# Patient Record
Sex: FEMALE | Race: WHITE | Employment: OTHER | URBAN - NONMETROPOLITAN AREA
[De-identification: names, ages, dates, MRNs, and addresses within clinical notes are randomized per-mention and may not be internally consistent; named-entity substitution may affect disease eponyms.]

---

## 2021-09-29 ENCOUNTER — TELEPHONE (OUTPATIENT)
Dept: NEUROSURGERY | Age: 46
End: 2021-09-29

## 2021-10-01 ENCOUNTER — TELEPHONE (OUTPATIENT)
Dept: NEUROSURGERY | Age: 46
End: 2021-10-01

## 2021-12-01 ENCOUNTER — TELEPHONE (OUTPATIENT)
Dept: NEUROSURGERY | Age: 46
End: 2021-12-01

## 2021-12-01 ENCOUNTER — OFFICE VISIT (OUTPATIENT)
Dept: NEUROSURGERY | Age: 46
End: 2021-12-01
Payer: MEDICAID

## 2021-12-01 VITALS
HEART RATE: 85 BPM | HEIGHT: 67 IN | DIASTOLIC BLOOD PRESSURE: 90 MMHG | BODY MASS INDEX: 17.58 KG/M2 | SYSTOLIC BLOOD PRESSURE: 141 MMHG | WEIGHT: 112 LBS

## 2021-12-01 DIAGNOSIS — M54.50 ACUTE BILATERAL LOW BACK PAIN WITHOUT SCIATICA: ICD-10-CM

## 2021-12-01 DIAGNOSIS — R29.898 RIGHT LEG WEAKNESS: ICD-10-CM

## 2021-12-01 DIAGNOSIS — M54.2 NECK PAIN: Primary | ICD-10-CM

## 2021-12-01 DIAGNOSIS — M25.512 ACUTE PAIN OF LEFT SHOULDER: ICD-10-CM

## 2021-12-01 DIAGNOSIS — R29.898 LEFT ARM WEAKNESS: ICD-10-CM

## 2021-12-01 DIAGNOSIS — G44.52 NEW PERSISTENT DAILY HEADACHE: ICD-10-CM

## 2021-12-01 PROCEDURE — G8484 FLU IMMUNIZE NO ADMIN: HCPCS | Performed by: NURSE PRACTITIONER

## 2021-12-01 PROCEDURE — 99204 OFFICE O/P NEW MOD 45 MIN: CPT | Performed by: NURSE PRACTITIONER

## 2021-12-01 PROCEDURE — 1036F TOBACCO NON-USER: CPT | Performed by: NURSE PRACTITIONER

## 2021-12-01 PROCEDURE — G8419 CALC BMI OUT NRM PARAM NOF/U: HCPCS | Performed by: NURSE PRACTITIONER

## 2021-12-01 PROCEDURE — G8427 DOCREV CUR MEDS BY ELIG CLIN: HCPCS | Performed by: NURSE PRACTITIONER

## 2021-12-01 RX ORDER — GABAPENTIN 300 MG/1
300 CAPSULE ORAL EVERY 8 HOURS
Qty: 90 CAPSULE | Refills: 2 | Status: SHIPPED | OUTPATIENT
Start: 2021-12-01 | End: 2022-03-01 | Stop reason: SDUPTHER

## 2021-12-01 RX ORDER — GABAPENTIN 100 MG/1
100 CAPSULE ORAL EVERY 8 HOURS
COMMUNITY
Start: 2021-07-24 | End: 2021-12-01 | Stop reason: SDUPTHER

## 2021-12-01 RX ORDER — UBROGEPANT 100 MG/1
TABLET ORAL
Qty: 10 TABLET | Refills: 3 | Status: SHIPPED | OUTPATIENT
Start: 2021-12-01

## 2021-12-01 RX ORDER — GABAPENTIN 100 MG/1
100 CAPSULE ORAL EVERY 8 HOURS
Qty: 90 CAPSULE | Refills: 2 | Status: SHIPPED | OUTPATIENT
Start: 2021-12-01 | End: 2021-12-01 | Stop reason: SDUPTHER

## 2021-12-01 RX ORDER — METHOCARBAMOL 500 MG/1
500 TABLET, FILM COATED ORAL DAILY
COMMUNITY
Start: 2021-10-14

## 2021-12-01 NOTE — PROGRESS NOTES
REVIEW OF SYSTEMS    Constitutional: []Fever []Sweats []Chills [] Recent Injury [x] Denies all unless marked  HEENT:[]Headache  [] Head Injury [] Hearing Loss  [] Sore Throat  [] Ear Ache [x] Denies all unless marked  Spine:  [] Neck pain  [] Back pain  [] Sciaticia  [x] Denies all unless marked  Cardiovascular:[]Heart Disease []Palpitations [] Chest Pain   [x] Denies all unless marked  Pulmonary: []Shortness of Breath []Cough   [x] Denies all unless marked  Psychiatric/Behavioral:[] Depression [] Anxiety [x] Denies all unless marked  Gastrointestinal: []Nausea  []Vomiting  []Abdominal Pain  []Constipation  []Diarrhea  [x] Denies all unless marked  Genitourinary:   [] Frequency  [] Urgency  [] Dysuria [] Incontinence  [x] Denies all unless marked  Extremities: [x]Pain  []Swelling  [x] Denies all unless marked  Musculoskeletal: [] Myalgias  [x] Joint Pain  [] Arthritis [x] Muscle Cramps [] Muscle Twitches  [x] Denies all unless marked  Sleep: []Insomnia[]Snoring []Restless Legs  []Sleep Apnea  []Daytime Sleepiness  [x] Denies all unless marked  Skin:[] Rash [] Color Change [x] Denies all unless marked   Neurological:[]Visual Disturbance [] Memory Loss []Loss of Balance []Slurred Speech []Weakness []Seizures  [] Dizziness [x] Denies all unless marked
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
## 2021-12-01 NOTE — TELEPHONE ENCOUNTER
Mei spoke with Wale Wright her to cancel the 100mg script of gabapentin pt will be able to  300mg script. Carin verbalized understanding with read back.

## 2021-12-06 ENCOUNTER — HOSPITAL ENCOUNTER (OUTPATIENT)
Dept: MRI IMAGING | Age: 46
Discharge: HOME | End: 2021-12-06
Payer: MEDICAID

## 2021-12-06 DIAGNOSIS — R29.898 RIGHT LEG WEAKNESS: ICD-10-CM

## 2021-12-06 DIAGNOSIS — I67.1 BRAIN ANEURYSM: Primary | ICD-10-CM

## 2021-12-06 DIAGNOSIS — M54.50 ACUTE BILATERAL LOW BACK PAIN WITHOUT SCIATICA: ICD-10-CM

## 2021-12-06 DIAGNOSIS — G44.52 NEW PERSISTENT DAILY HEADACHE: ICD-10-CM

## 2021-12-06 DIAGNOSIS — M54.2 NECK PAIN: ICD-10-CM

## 2021-12-06 PROCEDURE — 70553 MRI BRAIN STEM W/O & W/DYE: CPT

## 2021-12-06 PROCEDURE — 72156 MRI NECK SPINE W/O & W/DYE: CPT

## 2021-12-06 PROCEDURE — 6360000004 HC RX CONTRAST MEDICATION: Performed by: NURSE PRACTITIONER

## 2021-12-06 PROCEDURE — 72158 MRI LUMBAR SPINE W/O & W/DYE: CPT

## 2021-12-06 PROCEDURE — A9585 GADOBUTROL INJECTION: HCPCS | Performed by: NURSE PRACTITIONER

## 2021-12-06 RX ADMIN — GADOBUTROL 5 ML: 604.72 INJECTION INTRAVENOUS at 14:34

## 2021-12-07 ENCOUNTER — TELEPHONE (OUTPATIENT)
Dept: NEUROSURGERY | Age: 46
End: 2021-12-07

## 2021-12-07 NOTE — TELEPHONE ENCOUNTER
----- Message from ANA Larsen sent at 12/6/2021  6:37 PM CST -----  Received perfect serve message from Dr. Cynthia Kasper. 6-7mm right MCA aneurysm noted on MRI brain, will plan for CTA head to better visualize and place referral to vascular neurosurgery urgently. I can talk with patient if you would like.

## 2021-12-07 NOTE — TELEPHONE ENCOUNTER
----- Message from ANA Montalvo sent at 12/6/2021  6:37 PM CST -----  Received perfect serve message from Dr. Nba Carmen. 6-7mm right MCA aneurysm noted on MRI brain, will plan for CTA head to better visualize and place referral to vascular neurosurgery urgently. I can talk with patient if you would like.

## 2021-12-07 NOTE — TELEPHONE ENCOUNTER
Called patient had to leave a hank detailed vm asking her to please return my call needing more imaging have it scheduled for 12-8-2021@ 3 pm. Wanting to discuss why in more detail on the phone rather than leaving it on her vm, asked that she return my call

## 2021-12-08 ENCOUNTER — HOSPITAL ENCOUNTER (OUTPATIENT)
Dept: CT IMAGING | Age: 46
Discharge: HOME | End: 2021-12-08
Payer: MEDICAID

## 2021-12-08 DIAGNOSIS — I67.1 BRAIN ANEURYSM: ICD-10-CM

## 2021-12-08 PROCEDURE — 6360000004 HC RX CONTRAST MEDICATION: Performed by: NURSE PRACTITIONER

## 2021-12-08 PROCEDURE — 70496 CT ANGIOGRAPHY HEAD: CPT

## 2021-12-08 RX ADMIN — IOPAMIDOL 75 ML: 755 INJECTION, SOLUTION INTRAVENOUS at 15:48

## 2021-12-08 NOTE — TELEPHONE ENCOUNTER
Patient called back and I told he the results per EG also to let her know that she needed another test patient understood and is having the CTA today

## 2021-12-09 ENCOUNTER — TELEPHONE (OUTPATIENT)
Dept: NEUROSURGERY | Age: 46
End: 2021-12-09

## 2021-12-09 DIAGNOSIS — I67.1 BRAIN ANEURYSM: Primary | ICD-10-CM

## 2021-12-09 NOTE — TELEPHONE ENCOUNTER
Called [facility] got voicemail of intake team. Left voicemail with call back number explaining this referral should have went as urgent instead of as routine. Requested call back at this time. cbc, bmp, t&s done results pending cbc, t&s done results pending

## 2021-12-09 NOTE — TELEPHONE ENCOUNTER
CTA head re-identifies aneurysm. Make sure she is scheduled to see vascular neurosurgery, urgent referral placed on Monday.

## 2021-12-10 NOTE — TELEPHONE ENCOUNTER
Patient called back stated that she missed a call , I did explain the results from the CTA per Chiquis Brewer also told her that Elie Pereira was working on her referral to [facility] for the Neurosurgery, will be expecting the call from Elie Pereira or [facility] patient understood to answer all phone calls also to clear out her vm

## 2021-12-17 NOTE — TELEPHONE ENCOUNTER
Melissa Junior requests a call back please, she requested an update on the status of the referral.    Thank you.

## 2021-12-17 NOTE — TELEPHONE ENCOUNTER
Called [facility]. Spoke with [employer] She stated that a referral was never received. Gave her all information about urgent referral. She advised me to fax all records pertaining to this referral to be reviewed. All records were faxed at this time (see media).      Called Patient explained this in detail and confirmed her appointment for 12/20/2021

## 2021-12-20 ENCOUNTER — TELEPHONE (OUTPATIENT)
Dept: NEUROSURGERY | Age: 46
End: 2021-12-20

## 2021-12-20 NOTE — TELEPHONE ENCOUNTER
Neelam Almaraz called back in advising that [facility] would be too far away for her to travel and patient is wanting to see if there is somewhere closer she can be referred to. Please advise. A good call back time is anytime.  Thank you

## 2021-12-20 NOTE — TELEPHONE ENCOUNTER
Called pt spoke with her. Pt voiced that she had an appointment today with [facility] but was sick and unable to make it. Pt voiced that was a long way away. Informed due to pts insurance we didn't have many options. Pt voiced understanding and stated she would call them to reschedule. Again reinforced the importance to her that this needed to be followed up with. Pt voiced understanding.

## 2022-01-19 ENCOUNTER — TELEPHONE (OUTPATIENT)
Dept: NEUROSURGERY | Age: 47
End: 2022-01-19

## 2022-01-19 NOTE — TELEPHONE ENCOUNTER
Can you call the patient and see what's going on?  If we can't reach her can you let her primary know that she needs to see [facility] for the aneurysm

## 2022-01-19 NOTE — TELEPHONE ENCOUNTER
Was following up on referrals and noticed that [facility] had a tele health visit with  [facility] Physician Regina Gan on 12-20-21. I spoke with Jimmy Field at [facility] and she stated patient has not had surgery, Patient was to have a had a COVID test on 12-31-21 and no showed, patient was scheduled again on 1-21-22 and patient cancelled.

## 2022-02-28 DIAGNOSIS — G44.52 NEW PERSISTENT DAILY HEADACHE: ICD-10-CM

## 2022-02-28 NOTE — TELEPHONE ENCOUNTER
Yaima Helton has requested a refill on her medication. Last office visit : 12/1/2021   Next office visit : 3/25/2022   Last medication refill :12/1/2021 w/2rf   Emma Fitzgerald : 2/21/2022      Requested Prescriptions     Pending Prescriptions Disp Refills    gabapentin (NEURONTIN) 300 MG capsule 90 capsule 2     Sig: Take 1 capsule by mouth every 8 hours for 90 days.

## 2022-03-01 RX ORDER — GABAPENTIN 300 MG/1
300 CAPSULE ORAL EVERY 8 HOURS
Qty: 90 CAPSULE | Refills: 2 | Status: SHIPPED | OUTPATIENT
Start: 2022-03-01 | End: 2022-06-23

## 2022-06-23 DIAGNOSIS — G44.52 NEW PERSISTENT DAILY HEADACHE: ICD-10-CM

## 2022-06-23 RX ORDER — GABAPENTIN 300 MG/1
CAPSULE ORAL
Qty: 90 CAPSULE | Refills: 1 | Status: SHIPPED | OUTPATIENT
Start: 2022-06-23 | End: 2022-08-22

## 2022-06-23 NOTE — TELEPHONE ENCOUNTER
Requested Prescriptions     Pending Prescriptions Disp Refills    gabapentin (NEURONTIN) 300 MG capsule [Pharmacy Med Name: GABAPENTIN 300MG CAPS] 90 capsule 1     Sig: TAKE 1 CAPSULE BY MOUTH EVERY 8 HOURS (30 DAYS)       Last Office Visit:  12/1/2021  Next Office Visit:  none  Last Medication Refill:  3/1/22 with 2 RF  Paris Showers up to date:  6/23/22    *RX updated to reflect   6/23/22  fill date*

## 2022-08-22 DIAGNOSIS — G44.52 NEW PERSISTENT DAILY HEADACHE: ICD-10-CM

## 2022-08-22 RX ORDER — GABAPENTIN 300 MG/1
CAPSULE ORAL
Qty: 90 CAPSULE | Refills: 0 | Status: SHIPPED | OUTPATIENT
Start: 2022-08-24 | End: 2022-09-20

## 2022-08-22 NOTE — TELEPHONE ENCOUNTER
Requested Prescriptions     Pending Prescriptions Disp Refills    gabapentin (NEURONTIN) 300 MG capsule [Pharmacy Med Name: GABAPENTIN 300MG CAPS] 90 capsule 0     Sig: TAKE 1 CAPSULE BY MOUTH EVERY 8 HOURS (30 DAYS)       Last Office Visit:  12/1/2021  Next Office Visit:  Visit date not found  Last Medication Refill:  6/23/2022  Peewee Gums up to date:  8/22/2022     *RX updated to reflect   8/24/2022   fill date*

## 2022-09-20 DIAGNOSIS — G44.52 NEW PERSISTENT DAILY HEADACHE: ICD-10-CM

## 2022-09-20 RX ORDER — GABAPENTIN 300 MG/1
CAPSULE ORAL
Qty: 90 CAPSULE | Refills: 0 | Status: SHIPPED | OUTPATIENT
Start: 2022-09-23 | End: 2022-10-17 | Stop reason: SDUPTHER

## 2022-10-17 DIAGNOSIS — G44.52 NEW PERSISTENT DAILY HEADACHE: ICD-10-CM

## 2022-10-17 RX ORDER — GABAPENTIN 300 MG/1
CAPSULE ORAL
Qty: 90 CAPSULE | Refills: 0 | Status: SHIPPED | OUTPATIENT
Start: 2022-10-17 | End: 2022-11-17

## 2022-10-17 NOTE — TELEPHONE ENCOUNTER
Akutan Mayitoalivia requested a refill of her : -     Gabapentin ( Neurontin ) 300 mg          Last Appt:  12/1/2021  Next Appt: Appointment has been requested  Preferred pharmacy: [facility] [place], [facility]     Patient requests a call back to schedule a f/u appt please.

## 2022-10-17 NOTE — TELEPHONE ENCOUNTER
Requested Prescriptions     Pending Prescriptions Disp Refills    gabapentin (NEURONTIN) 300 MG capsule 90 capsule 0     Sig: TAKE 1 CAPSULE BY MOUTH EVERY 8 HOURS (30 DAYS)       Last Office Visit:  12/1/2021  Next Office Visit:  Visit date not found  Last Medication Refill:  09/20/2022  Rodney Lehman up to date:  10/17/2022    *RX updated to reflect   10/17/2022 fill date*

## 2022-10-31 ENCOUNTER — TELEPHONE (OUTPATIENT)
Dept: NEUROSURGERY | Age: 47
End: 2022-10-31

## 2022-10-31 NOTE — TELEPHONE ENCOUNTER
Telma Montes De Oca requests that office  return their call. The best time to reach her is Anytime. Patient needs scheduled her yearly follow up with Jenny Roman    Thank you.

## 2022-11-14 DIAGNOSIS — G44.52 NEW PERSISTENT DAILY HEADACHE: ICD-10-CM

## 2022-11-15 RX ORDER — GABAPENTIN 300 MG/1
CAPSULE ORAL
Qty: 90 CAPSULE | Refills: 0 | Status: SHIPPED | OUTPATIENT
Start: 2022-11-16 | End: 2022-12-16

## 2022-12-29 DIAGNOSIS — G44.52 NEW PERSISTENT DAILY HEADACHE: ICD-10-CM

## 2022-12-29 NOTE — TELEPHONE ENCOUNTER
Requested Prescriptions     Pending Prescriptions Disp Refills    gabapentin (NEURONTIN) 300 MG capsule [Pharmacy Med Name: GABAPENTIN 300MG CAPS] 90 capsule 0     Sig: TAKE 1 CAPSULE BY MOUTH EVERY 8 HOURS (30 DAYS)       Last Office Visit:  12/1/2021  Next Office Visit:  2/8/23  Last Medication Refill:  11/16/22  [place] up to date:  10/17/22    *RX updated to reflect   12/29/22  fill date*    Travon Danielle pt

## 2022-12-30 RX ORDER — GABAPENTIN 300 MG/1
CAPSULE ORAL
Qty: 90 CAPSULE | Refills: 0 | Status: SHIPPED | OUTPATIENT
Start: 2022-12-30 | End: 2023-01-28

## 2023-01-23 ENCOUNTER — TELEPHONE (OUTPATIENT)
Dept: NEUROLOGY | Age: 48
End: 2023-01-23

## 2023-01-23 NOTE — TELEPHONE ENCOUNTER
Called pt LVM explaining appointment for 2/8 was rescheduled for 3/8 @ 0981. Call back number has been left.

## 2023-02-06 DIAGNOSIS — G44.52 NEW PERSISTENT DAILY HEADACHE: ICD-10-CM

## 2023-02-06 RX ORDER — GABAPENTIN 300 MG/1
CAPSULE ORAL
Qty: 90 CAPSULE | Refills: 0 | Status: SHIPPED | OUTPATIENT
Start: 2023-02-06 | End: 2023-03-05

## 2023-02-06 NOTE — TELEPHONE ENCOUNTER
She must have a follow up with me for me to continue filling this medication (Gabapentin). She was last seen 12/2021.

## 2023-02-06 NOTE — TELEPHONE ENCOUNTER
Requested Prescriptions     Pending Prescriptions Disp Refills    gabapentin (NEURONTIN) 300 MG capsule [Pharmacy Med Name: GABAPENTIN 300MG CAPS] 90 capsule 0     Sig: TAKE 1 CAPSULE BY MOUTH EVERY 8 HOURS (30 DAYS)       Last Office Visit:  12/1/2021  Next Office Visit:  Visit date not found  Last Medication Refill:   12/30/2022 with 0 RF   Vasu up to date:  2/6/2023     *RX updated to reflect   2/6/2023  fill date*

## 2023-02-08 NOTE — TELEPHONE ENCOUNTER
Pt has appointment 3/2023.   No further refills until patient is seen in the [address] [place] [address]

## 2023-03-08 ENCOUNTER — TELEPHONE (OUTPATIENT)
Dept: NEUROLOGY | Age: 48
End: 2023-03-08

## 2023-03-08 ENCOUNTER — OFFICE VISIT (OUTPATIENT)
Dept: NEUROLOGY | Age: 48
End: 2023-03-08
Payer: MEDICAID

## 2023-03-08 ENCOUNTER — HOSPITAL ENCOUNTER (OUTPATIENT)
Dept: CT IMAGING | Age: 48
Discharge: HOME | End: 2023-03-08
Payer: MEDICAID

## 2023-03-08 VITALS
WEIGHT: 112 LBS | HEART RATE: 70 BPM | BODY MASS INDEX: 17.58 KG/M2 | SYSTOLIC BLOOD PRESSURE: 141 MMHG | HEIGHT: 67 IN | DIASTOLIC BLOOD PRESSURE: 85 MMHG | OXYGEN SATURATION: 98 %

## 2023-03-08 DIAGNOSIS — T81.89XA NON-HEALING SURGICAL WOUND, INITIAL ENCOUNTER: Primary | ICD-10-CM

## 2023-03-08 DIAGNOSIS — T81.89XA NON-HEALING SURGICAL WOUND, INITIAL ENCOUNTER: ICD-10-CM

## 2023-03-08 DIAGNOSIS — M25.512 CHRONIC LEFT SHOULDER PAIN: ICD-10-CM

## 2023-03-08 DIAGNOSIS — G89.29 CHRONIC LEFT SHOULDER PAIN: ICD-10-CM

## 2023-03-08 LAB
BASOPHILS ABSOLUTE: 0 K/UL (ref 0–0.2)
BASOPHILS RELATIVE PERCENT: 0.6 % (ref 0–1)
C-REACTIVE PROTEIN: <0.3 MG/DL (ref 0–0.5)
EOSINOPHILS ABSOLUTE: 0.1 K/UL (ref 0–0.6)
EOSINOPHILS RELATIVE PERCENT: 1 % (ref 0–5)
HCT VFR BLD CALC: 46.8 % (ref 37–47)
HEMOGLOBIN: 15.8 G/DL (ref 12–16)
IMMATURE GRANULOCYTES #: 0 K/UL
LYMPHOCYTES ABSOLUTE: 1.8 K/UL (ref 1.1–4.5)
LYMPHOCYTES RELATIVE PERCENT: 36.9 % (ref 20–40)
MCH RBC QN AUTO: 35.3 PG (ref 27–31)
MCHC RBC AUTO-ENTMCNC: 33.8 G/DL (ref 33–37)
MCV RBC AUTO: 104.7 FL (ref 81–99)
MONOCYTES ABSOLUTE: 0.5 K/UL (ref 0–0.9)
MONOCYTES RELATIVE PERCENT: 9.2 % (ref 0–10)
NEUTROPHILS ABSOLUTE: 2.6 K/UL (ref 1.5–7.5)
NEUTROPHILS RELATIVE PERCENT: 52.3 % (ref 50–65)
PDW BLD-RTO: 13.3 % (ref 11.5–14.5)
PLATELET # BLD: 194 K/UL (ref 130–400)
PMV BLD AUTO: 9.1 FL (ref 9.4–12.3)
RBC # BLD: 4.47 M/UL (ref 4.2–5.4)
SEDIMENTATION RATE, ERYTHROCYTE: 8 MM/HR (ref 0–20)
WBC # BLD: 4.9 K/UL (ref 4.8–10.8)

## 2023-03-08 PROCEDURE — G8427 DOCREV CUR MEDS BY ELIG CLIN: HCPCS | Performed by: NURSE PRACTITIONER

## 2023-03-08 PROCEDURE — 1036F TOBACCO NON-USER: CPT | Performed by: NURSE PRACTITIONER

## 2023-03-08 PROCEDURE — 99214 OFFICE O/P EST MOD 30 MIN: CPT | Performed by: NURSE PRACTITIONER

## 2023-03-08 PROCEDURE — G8419 CALC BMI OUT NRM PARAM NOF/U: HCPCS | Performed by: NURSE PRACTITIONER

## 2023-03-08 PROCEDURE — 70450 CT HEAD/BRAIN W/O DYE: CPT

## 2023-03-08 PROCEDURE — G8484 FLU IMMUNIZE NO ADMIN: HCPCS | Performed by: NURSE PRACTITIONER

## 2023-03-08 RX ORDER — UBROGEPANT 100 MG/1
TABLET ORAL
Qty: 16 TABLET | Refills: 3 | Status: SHIPPED | OUTPATIENT
Start: 2023-03-08
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
## 2023-03-08 NOTE — TELEPHONE ENCOUNTER
Dr. Jamee Cr called to discuss patient's case. Reviewed her CT head and no overt bony erosion. Awaiting labs still. Dr. Jamee Cr will work her in to see either him or his nurse practitioner soon. They should be calling her soon to get her scheduled. If she has not heard from them by Friday then have her call us.

## 2023-03-08 NOTE — PROGRESS NOTES
REVIEW OF SYSTEMS    Constitutional: []Fever []Sweats []Chills [] Recent Injury [x] Denies all unless marked  HEENT:[x]Headache  [] Head Injury [] Hearing Loss  [] Sore Throat  [] Ear Ache [x] Denies all unless marked  Spine:  [x] Neck pain  [] Back pain  [] Sciaticia  [x] Denies all unless marked  Cardiovascular:[]Heart Disease []Palpitations [] Chest Pain   [x] Denies all unless marked  Pulmonary: []Shortness of Breath []Cough   [x] Denies all unless marked  Psychiatric/Behavioral:[] Depression [] Anxiety [x] Denies all unless marked  Gastrointestinal: []Nausea  []Vomiting  []Abdominal Pain  []Constipation  []Diarrhea  [x] Denies all unless marked  Genitourinary:   [] Frequency  [] Urgency  [] Dysuria [] Incontinence  [x] Denies all unless marked  Extremities: []Pain  []Swelling  [x] Denies all unless marked  Musculoskeletal: [] Myalgias  [] Joint Pain  [] Arthritis [] Muscle Cramps [] Muscle Twitches  [x] Denies all unless marked  Sleep: []Insomnia[]Snoring []Restless Legs  []Sleep Apnea  []Daytime Sleepiness  [x] Denies all unless marked  Skin:[] Rash [] Color Change [x] Denies all unless marked   Neurological:[]Visual Disturbance [] Memory Loss []Loss of Balance []Slurred Speech []Weakness []Seizures  [] Dizziness [x] Denies all unless marked

## 2023-03-09 NOTE — TELEPHONE ENCOUNTER
Patient called in this morning about results. Advised her of CT results and labs. Pt voiced The[facility] [place] had reached out to her and she has an appointment on 3/13 at 1230? She will call if anything else is needed.

## 2023-03-10 DIAGNOSIS — G44.52 NEW PERSISTENT DAILY HEADACHE: ICD-10-CM

## 2023-03-13 ENCOUNTER — CLINICAL DOCUMENTATION (OUTPATIENT)
Dept: NEUROLOGY | Age: 48
End: 2023-03-13

## 2023-03-13 RX ORDER — GABAPENTIN 300 MG/1
CAPSULE ORAL
Qty: 90 CAPSULE | Refills: 0 | Status: SHIPPED | OUTPATIENT
Start: 2023-03-13 | End: 2023-04-12

## 2023-03-13 NOTE — PROGRESS NOTES
Man Drilling Key: [id number] - PA Case ID: [id number] - Rx #: [id number] help?  Call us at [phone]  Status  Sent to [facility]  Drug  Ubrelvy 100MG tablets  Form  [facility] Form 2017 [facility]  68,12

## 2023-03-13 NOTE — TELEPHONE ENCOUNTER
Requested Prescriptions     Pending Prescriptions Disp Refills    gabapentin (NEURONTIN) 300 MG capsule [Pharmacy Med Name: GABAPENTIN 300MG CAPS] 90 capsule 0     Sig: TAKE 1 CAPSULE BY MOUTH EVERY 8 HOURS (30 DAYS)       Last Office Visit:  12/1/2021  Next Office Visit:  5/8/23  Last Medication Refill: 2/6/23    Raimundo Carrillo up to date:  2/6/23    *RX updated to reflect   3/13/23  fill date*

## 2023-03-16 ENCOUNTER — TELEPHONE (OUTPATIENT)
Dept: NEUROSURGERY | Age: 48
End: 2023-03-16

## 2023-03-16 NOTE — TELEPHONE ENCOUNTER
Called patient back per EG had to leave a vm asked her to return my call @ [phone] also told her I did not have a vm . Thania Leslie

## 2023-03-17 NOTE — TELEPHONE ENCOUNTER
Called patient again for the second time, had to leave another  asking the patient to return my call @ [phone] ask for Roseanne REYES.  Trying to get a hold of patient per Tatyana Clay

## 2023-04-18 ENCOUNTER — HOSPITAL ENCOUNTER (OUTPATIENT)
Dept: MRI IMAGING | Age: 48
Discharge: HOME | End: 2023-04-18
Payer: MEDICAID

## 2023-04-18 DIAGNOSIS — T14.90XD HEALING WOUND: ICD-10-CM

## 2023-04-18 PROCEDURE — 6360000004 HC RX CONTRAST MEDICATION: Performed by: NURSE PRACTITIONER

## 2023-04-18 PROCEDURE — 70553 MRI BRAIN STEM W/O & W/DYE: CPT | Performed by: RADIOLOGY

## 2023-04-18 PROCEDURE — A9577 INJ MULTIHANCE: HCPCS | Performed by: NURSE PRACTITIONER

## 2023-04-18 PROCEDURE — 70553 MRI BRAIN STEM W/O & W/DYE: CPT

## 2023-04-18 RX ADMIN — GADOBENATE DIMEGLUMINE 10 ML: 529 INJECTION, SOLUTION INTRAVENOUS at 15:35

## 2023-05-11 ENCOUNTER — TRANSCRIBE ORDERS (OUTPATIENT)
Dept: ADMINISTRATIVE | Age: 48
End: 2023-05-11

## 2023-05-11 DIAGNOSIS — S01.00XA OPEN WOUND OF SCALP, UNSPECIFIED OPEN WOUND TYPE, INITIAL ENCOUNTER: Primary | ICD-10-CM

## 2023-05-30 DIAGNOSIS — G44.52 NEW PERSISTENT DAILY HEADACHE: ICD-10-CM

## 2023-05-30 NOTE — TELEPHONE ENCOUNTER
Requested Prescriptions     Pending Prescriptions Disp Refills    gabapentin (NEURONTIN) 300 MG capsule [Pharmacy Med Name: GABAPENTIN 300MG CAPS] 90 capsule 0     Sig: TAKE 1 CAPSULE BY MOUTH EVERY 8 HOURS (30 DAYS)       Last Office Visit:  3/8/23  Next Office Visit:  none  Last Medication Refill:  4/13/23  Obed Cooley up to date:  5/5/23    *RX updated to reflect   5/30/23  fill date*

## 2023-05-31 RX ORDER — GABAPENTIN 300 MG/1
CAPSULE ORAL
Qty: 90 CAPSULE | Refills: 0 | Status: SHIPPED | OUTPATIENT
Start: 2023-05-31 | End: 2023-06-29

## 2023-06-06 ENCOUNTER — HOSPITAL ENCOUNTER (OUTPATIENT)
Dept: CT IMAGING | Age: 48
Discharge: HOME | End: 2023-06-06
Payer: MEDICAID

## 2023-06-06 DIAGNOSIS — S01.00XA OPEN WOUND OF SCALP, UNSPECIFIED OPEN WOUND TYPE, INITIAL ENCOUNTER: ICD-10-CM

## 2023-06-06 PROCEDURE — 70450 CT HEAD/BRAIN W/O DYE: CPT

## 2023-07-12 DIAGNOSIS — G44.52 NEW PERSISTENT DAILY HEADACHE: ICD-10-CM

## 2023-07-12 RX ORDER — GABAPENTIN 300 MG/1
CAPSULE ORAL
Qty: 90 CAPSULE | Refills: 0 | Status: SHIPPED | OUTPATIENT
Start: 2023-07-12 | End: 2023-08-11

## 2023-07-12 NOTE — TELEPHONE ENCOUNTER
Requested Prescriptions     Pending Prescriptions Disp Refills    gabapentin (NEURONTIN) 300 MG capsule [Pharmacy Med Name: GABAPENTIN 300MG CAPS] 90 capsule 0     Sig: TAKE 1 CAPSULE BY MOUTH EVERY 8 HOURS (30 DAYS)       Last Office Visit:  12/1/2021  Next Office Visit:  Visit date not found  Last Medication Refill:  5/31/2023 with 0 EUGENIA Partida up to date:  5/5/2023     *RX updated to reflect   7/12/2023   fill date*

## 2023-09-02 DIAGNOSIS — G44.52 NEW PERSISTENT DAILY HEADACHE: ICD-10-CM

## 2023-09-05 RX ORDER — GABAPENTIN 300 MG/1
CAPSULE ORAL
Qty: 90 CAPSULE | Refills: 0 | Status: SHIPPED | OUTPATIENT
Start: 2023-09-05 | End: 2023-10-05

## 2023-09-05 NOTE — TELEPHONE ENCOUNTER
Requested Prescriptions     Pending Prescriptions Disp Refills    gabapentin (NEURONTIN) 300 MG capsule [Pharmacy Med Name: GABAPENTIN 300MG CAPS] 90 capsule 0     Sig: TAKE 1 CAPSULE BY MOUTH EVERY 8 HOURS (30 DAYS)       Last Office Visit:  12/1/2021  Next Office Visit:  none  Last Medication Refill:  7/12/23  Albert Maya up to date:  5/5/23    *RX updated to reflect   9/5/23  fill date*      Pt hasn't been seen since Dec 2021.

## 2023-09-29 DIAGNOSIS — G44.52 NEW PERSISTENT DAILY HEADACHE: ICD-10-CM

## 2023-10-02 RX ORDER — GABAPENTIN 300 MG/1
CAPSULE ORAL
Qty: 90 CAPSULE | Refills: 0 | Status: SHIPPED | OUTPATIENT
Start: 2023-10-05 | End: 2023-11-04

## 2023-10-02 NOTE — TELEPHONE ENCOUNTER
Requested Prescriptions     Pending Prescriptions Disp Refills    gabapentin (NEURONTIN) 300 MG capsule [Pharmacy Med Name: GABAPENTIN 300MG CAPS] 90 capsule 0     Sig: TAKE 1 CAPSULE BY MOUTH EVERY 8 HOURS (30 DAYS)       Last Office Visit:  12/1/2021  Next Office Visit:  Visit date not found  Last Medication Refill:  9/5/2023 with 0 RF   Alisha Pratt up to date:  10/2/2023    *RX updated to reflect   10/5/2023  fill date*    Javed Lunsford patient